# Patient Record
(demographics unavailable — no encounter records)

---

## 2017-02-16 NOTE — EDDOCDS
Nurse's Notes                                                                                     

[facility]                                                                         

Name: Ryan Cranker                                                                                

Age: 29 yrs                                                                                       

Sex: Male                                                                                         

: 1987                                                                                   

MRN: [id number]                                                                                     

Arrival Date: 2017                                                                          

Time: 15:26                                                                                       

Account#: [id number]                                                                              

Bed PD                                                                                      

Private MD:                                                                                       

Diagnosis: Dental caries;Jaw pain                                                                 

                                                                                                  

Presentation:                                                                                     

                                                                                             

15:29 Presenting complaint: Patient states: Right jaw pain radiating to right ear began a     mlb1

      week ago. Adult Sepsis Screening: The patient does not have new or worsening altered        

      mentation. Patient's respiratory rate is less than 22. Systolic blood pressure is           

      greater than 100. Patient has a qSOFA score of 0- Negative Sepsis Screen.                   

      Suicide/Homicide risk assessment- the patient denies having any suicidal and/or             

      homicidal ideations and does not present with any other emotional, behavioral or mental     

      health complaints.  Status: Patient is not a  or              

      dependent. Transition of care: patient was not received from another setting of care.       

15:29 Acuity: GINO Level 4                                                                     mlb1

15:29 Method Of Arrival: Walkin/Carried/Asstd                                                 mlb1

                                                                                                  

Triage Assessment:                                                                                

15:31 General: Appears in no apparent distress, Behavior is appropriate for age, cooperative. mlb1

      Pain: Location: right ear and right jaw Pain currently is 7 out of 10 on a pain scale.      

      HIV screening NA for this visit Offered previously.                                         

                                                                                                  

Historical:                                                                                       

- Allergies: no known allergies;                                                                  

- Home Meds:                                                                                      

1. Adderall XR 30 mg Oral cp24 1 cap once daily                                                 

2. gabapentin 300 mg Oral tab four times a day                                                  

3. Methadone Oral once daily                                                                    

- PMHx: ADHD;                                                                                     

- PSHx: none;                                                                                     

- Social history: Smoking status: Patient states former smoker of tobacco. No barriers            

to communication noted, The patient speaks fluent English, Speaks appropriately for             

age.                                                                                            

- Family history: Not pertinent.                                                                  

- : The pt / caregiver states he / she is not on anticoagulants. Home medication list             

is obtained from the patient.                                                                   

- Exposure Risk Screening:: None identified.                                                      

                                                                                                  

                                                                                                  

Screening:                                                                                        

15:49 Screening information is obtained from the patient. Fall risk: No risks identified.     jo3 

      Assistance ADL's: requires no assistance with activities of daily living. Abuse/DV          

      Screen: The patient / caregiver reports he/she is: not in a situation that causes fear,     

      pain or injury. Nutritional screening: No deficits noted. Advance Directives: There is      

      no active DNR order. home support is adequate.                                              

                                                                                                  

Assessment:                                                                                       

15:49 General: Appears in no apparent distress, comfortable, Behavior is appropriate for age, jo3 

      cooperative. General: Pt disclosed to this writer that he is on methadone at a clinic       

      and does not want to get in trouble and asked that Ultram be cancelled. Advised             

      provider . Neurological: No deficits noted. Level of Consciousness is awake, alert,         

      Oriented to person, place, time. Cardiovascular: No deficits noted. Respiratory: No         

      deficits noted. Airway is patent Respiratory effort is even, unlabored. Derm: Skin is       

      pink, warm & dry.                                                                         

                                                                                                  

Vital Signs:                                                                                      

15:28  / 64; Pulse 60; Resp 16; Temp 98.3(O); Pulse Ox 97% on R/A; Weight 74.84 kg (R); lr2 

      Height 5 ft. 11 in. (180.34 cm) (R); Pain 6/10;                                             

15:28 Body Mass Index 23.01 (74.84 kg, 180.34 cm)                                             lr2 

                                                                                                  

Vitals:                                                                                           

15:28 Log In Time: 2017 at 15:26.                                                lr2 

                                                                                                  

ED Course:                                                                                        

15:28 Patient visited by Leti Anthony.                                                         lr2 

15:28 Patient moved to Waiting                                                                lr2 

15:28 Patient moved to Pre RCE                                                                lr2 

15:29 Patient visited by Danielito Cristobal RN.                                               mlb1

15:30 Triage Initiated                                                                        mlb1

15:33 [facility]                                                        ar2 

15:33 Gregory Dupree MD is Attending Physician.                                               ar2 

15:34 Patient visited by Amor Alex PA-C.                                             ar2 

15:34 Patient moved to                                                                jb5 

15:44 [facility] Payment Agreement was scanned into [employer] and attached to record.               jp5 

15:46 Patient name changed from Mahin\S\R\S\Cranker\S\ to Mahin\S\Mikal\S\Cranker.                    
   EDMS

15:49 The patient / caregiver is instructed regarding the plan of care and ED course.         jo3 

15:49 No IV's were initiated during this patient's visit. No procedures done that require     jo3 

      assistance.                                                                                 

                                                                                                  

Order Results:                                                                                    

There are currently no results for this order.                                                    

Outcome:                                                                                          

15:40 Discharge ordered by Provider.                                                          ar2 

15:49 Discharge Assessment: Patient awake, alert and oriented x 3. No cognitive and/or        jo3 

      functional deficits noted. Patient verbalized understanding of disposition                  

      instructions. patient administered narcotics - no. The following High Risk Discharge        

      criteria are identified: None. Discharged to home ambulatory. Condition: stable.            

      Discharge instructions given to patient, Instructed on discharge instructions, follow       

      up and referral plans. medication usage, Demonstrated understanding of instructions,        

      medications, Pt was receptive of discharge instructions/ teaching. Prescriptions given      

      X 2. No special radiology studies were completed. Property sent home with patient.          

15:51 Patient left the ED.                                                                    jo3 

                                                                                                  

Signatures:                                                                                       

Dispatcher [employer]                           EDMS                                                 

Danielito Cristobal RN                   RN   mlb1                                                 

Sachi Samuels, PCA                       PCA  jb5                                                  

Augusta AdameRN                    RN   jo3                                                  

Amor Alex PA-C PA-C ar2 Price, Jennalee jp5 Ross, Laura                                  lr2                                                  

                                                                                                  

Corrections: (The following items were deleted from the chart)                                    

15:32 15:28  / ???; Pulse 64bpm; Resp 16bpm; Pulse Ox 97% RA; Temp 98.3F Oral; 74.84 kg lr2 

      Reported; Height 5 ft. 11 in. Reported; BMI: 23.0; Pain 6/10; lr2                           

                                                                                                  

**************************************************************************************************

MTDD

## 2017-02-16 NOTE — EDDOCDS
Physician Documentation                                                                           

[facility]                                                                         

Name: Ryan Cranker                                                                                

Age: 29 yrs                                                                                       

Sex: Male                                                                                         

: 1987                                                                                   

MRN: [id number]                                                                                     

Arrival Date: 2017                                                                          

Time: 15:26                                                                                       

Account#: [id number]                                                                              

Bed PD                                                                                      

Private MD:                                                                                       

Disposition:                                                                                      

17 15:40 Discharged to Home/Self Care. Impression: Dental caries, Jaw pain.                 

- Condition is Stable.                                                                            

- Discharge Instructions: Dental Pain.                                                            

- Prescriptions for Augmentin 875- 125 mg Oral Tablet - take 1 tablet by ORAL route               

every 12 hours for 10 days; 20 tablet. Ibuprofen 800 mg Oral Tablet - take 1 tablet             

by ORAL route every 8 hours As needed take with food; 30 tablet.                                

- Medication Reconciliation, Local Pharmacy Hours, Dental Referral List form.                     

- Follow up: Private Physician; When: Call to arrange an appointment; Reason: Recheck             

today's complaints. Follow up: Emergency Department; When: As needed; Reason: Fever >           

102F, Trouble breathing, Worsening of conditions.                                               

- Problem is new.                                                                                 

- Symptoms are unchanged.                                                                         

                                                                                                  

                                                                                                  

                                                                                                  

Historical:                                                                                       

- Allergies: no known allergies;                                                                  

- Home Meds:                                                                                      

1. Adderall XR 30 mg Oral cp24 1 cap once daily                                                 

2. gabapentin 300 mg Oral tab four times a day                                                  

3. Methadone Oral once daily                                                                    

- PMHx: ADHD;                                                                                     

- PSHx: none;                                                                                     

- Social history: Smoking status: Patient states former smoker of tobacco. No barriers            

to communication noted, The patient speaks fluent English, Speaks appropriately for             

age.                                                                                            

- Family history: Not pertinent.                                                                  

- : The pt / caregiver states he / she is not on anticoagulants. Home medication list             

is obtained from the patient.                                                                   

- Exposure Risk Screening:: None identified.                                                      

                                                                                                  

                                                                                                  

Vital Signs:                                                                                      

                                                                                             

15:28  / 64; Pulse 60; Resp 16; Temp 98.3(O); Pulse Ox 97% on R/A; Weight 74.84 kg /    lr2 

      164.99 lbs (R); Height 5 ft. 11 in. (180.34 cm) (R); Pain 6/10;                             

15:28 Body Mass Index 23.01 (74.84 kg, 180.34 cm)                                             lr2 

                                                                                                  

MDM:                                                                                              

15:44 [facility] Payment Agreement was scanned into [employer] and attached to record.               jp5 

15:44 Financial registration complete.                                                        jp5 

                                                                                                  

Signatures:                                                                                       

Trumann, Danielito B, RN                   RN   mlb1                                                 

Augusta Adame,RN                    RN   jo3                                                  

Amor Alex, PAGracielaC                 PAMercedez Porter                              jp5                                                  

                                                                                                  

The chart was reviewed and I authenticate all verbal orders and agree with the evaluation and 
treatment provided.Attachments:

15:44 [facility] Payment Agreement                                                                jp5 

                                                                                                  

**************************************************************************************************

MTDD

## 2017-02-18 NOTE — EDDOCDS
Physician Documentation                                                                           

[facility]                                                                         

Name: Ryan Cranker                                                                                

Age: 29 yrs                                                                                       

Sex: Male                                                                                         

: 1987                                                                                   

MRN: [id number]                                                                                     

Arrival Date: 2017                                                                          

Time: 15:26                                                                                       

Account#: [id number]                                                                              

Bed PD                                                                                      

Private MD:                                                                                       

Disposition:                                                                                      

17 15:40 Discharged to Home/Self Care. Impression: Dental caries, Jaw pain.                 

- Condition is Stable.                                                                            

- Discharge Instructions: Dental Pain.                                                            

- Prescriptions for Augmentin 875- 125 mg Oral Tablet - take 1 tablet by ORAL route               

every 12 hours for 10 days; 20 tablet. Ibuprofen 800 mg Oral Tablet - take 1 tablet             

by ORAL route every 8 hours As needed take with food; 30 tablet.                                

- Medication Reconciliation, Local Pharmacy Hours, Dental Referral List form.                     

- Follow up: Private Physician; When: Call to arrange an appointment; Reason: Recheck             

today's complaints. Follow up: Emergency Department; When: As needed; Reason: Fever >           

102F, Trouble breathing, Worsening of conditions.                                               

- Problem is new.                                                                                 

- Symptoms are unchanged.                                                                         

                                                                                                  

                                                                                                  

                                                                                                  

Historical:                                                                                       

- Allergies: no known allergies;                                                                  

- Home Meds:                                                                                      

1. Adderall XR 30 mg Oral cp24 1 cap once daily                                                 

2. gabapentin 300 mg Oral tab four times a day                                                  

3. Methadone Oral once daily                                                                    

- PMHx: ADHD;                                                                                     

- PSHx: none;                                                                                     

- Social history: Smoking status: Patient states former smoker of tobacco. No barriers            

to communication noted, The patient speaks fluent English, Speaks appropriately for             

age.                                                                                            

- Family history: Not pertinent.                                                                  

- : The pt / caregiver states he / she is not on anticoagulants. Home medication list             

is obtained from the patient.                                                                   

- Exposure Risk Screening:: None identified.                                                      

                                                                                                  

                                                                                                  

Vital Signs:                                                                                      

                                                                                             

15:28  / 64; Pulse 60; Resp 16; Temp 98.3(O); Pulse Ox 97% on R/A; Weight 74.84 kg /    lr2 

      164.99 lbs (R); Height 5 ft. 11 in. (180.34 cm) (R); Pain 6/10;                             

15:28 Body Mass Index 23.01 (74.84 kg, 180.34 cm)                                             lr2 

                                                                                                  

MDM:                                                                                              

15:44 [facility] Payment Agreement was scanned into [employer] and attached to record.               jp5 

15:44 Financial registration complete.                                                        jp5 

                                                                                             

10:24 T-Sheet-- Draft Copy was scanned into [employer] and attached to record.                   gb  

                                                                                                  

Signatures:                                                                                       

Sheryl Otero, Reg                  Reg  gb                                                   

Danielito Cristobal RN                   RN   mlb1                                                 

Augusta Adame RN                    RN   jo3                                                  

Amor Alex, Mercedez Heard PA-C                              jp5                                                  

                                                                                                  

The chart was reviewed and I authenticate all verbal orders and agree with the evaluation and 
treatment provided.Attachments:

                                                                                             

15:44 [facility] Payment Agreement                                                                jp5 

                                                                                             

10:24 T-Sheet-- Draft Copy                                                                    gb  

                                                                                                  

**************************************************************************************************



*** Chart Complete ***
MTDD

## 2017-02-18 NOTE — EDDOCDS
Nurse's Notes                                                                                     

[facility]                                                                         

Name: Ryan Cranker                                                                                

Age: 29 yrs                                                                                       

Sex: Male                                                                                         

: 1987                                                                                   

MRN: [id number]                                                                                     

Arrival Date: 2017                                                                          

Time: 15:26                                                                                       

Account#: [id number]                                                                              

Bed PD                                                                                      

Private MD:                                                                                       

Diagnosis: Dental caries;Jaw pain                                                                 

                                                                                                  

Presentation:                                                                                     

                                                                                             

15:29 Presenting complaint: Patient states: Right jaw pain radiating to right ear began a     mlb1

      week ago. Adult Sepsis Screening: The patient does not have new or worsening altered        

      mentation. Patient's respiratory rate is less than 22. Systolic blood pressure is           

      greater than 100. Patient has a qSOFA score of 0- Negative Sepsis Screen.                   

      Suicide/Homicide risk assessment- the patient denies having any suicidal and/or             

      homicidal ideations and does not present with any other emotional, behavioral or mental     

      health complaints.  Status: Patient is not a  or              

      dependent. Transition of care: patient was not received from another setting of care.       

15:29 Acuity: GINO Level 4                                                                     mlb1

15:29 Method Of Arrival: Walkin/Carried/Asstd                                                 mlb1

                                                                                                  

Triage Assessment:                                                                                

15:31 General: Appears in no apparent distress, Behavior is appropriate for age, cooperative. mlb1

      Pain: Location: right ear and right jaw Pain currently is 7 out of 10 on a pain scale.      

      HIV screening NA for this visit Offered previously.                                         

                                                                                                  

Historical:                                                                                       

- Allergies: no known allergies;                                                                  

- Home Meds:                                                                                      

1. Adderall XR 30 mg Oral cp24 1 cap once daily                                                 

2. gabapentin 300 mg Oral tab four times a day                                                  

3. Methadone Oral once daily                                                                    

- PMHx: ADHD;                                                                                     

- PSHx: none;                                                                                     

- Social history: Smoking status: Patient states former smoker of tobacco. No barriers            

to communication noted, The patient speaks fluent English, Speaks appropriately for             

age.                                                                                            

- Family history: Not pertinent.                                                                  

- : The pt / caregiver states he / she is not on anticoagulants. Home medication list             

is obtained from the patient.                                                                   

- Exposure Risk Screening:: None identified.                                                      

                                                                                                  

                                                                                                  

Screening:                                                                                        

15:49 Screening information is obtained from the patient. Fall risk: No risks identified.     jo3 

      Assistance ADL's: requires no assistance with activities of daily living. Abuse/DV          

      Screen: The patient / caregiver reports he/she is: not in a situation that causes fear,     

      pain or injury. Nutritional screening: No deficits noted. Advance Directives: There is      

      no active DNR order. home support is adequate.                                              

                                                                                                  

Assessment:                                                                                       

15:49 General: Appears in no apparent distress, comfortable, Behavior is appropriate for age, jo3 

      cooperative. General: Pt disclosed to this writer that he is on methadone at a clinic       

      and does not want to get in trouble and asked that Ultram be cancelled. Advised             

      provider . Neurological: No deficits noted. Level of Consciousness is awake, alert,         

      Oriented to person, place, time. Cardiovascular: No deficits noted. Respiratory: No         

      deficits noted. Airway is patent Respiratory effort is even, unlabored. Derm: Skin is       

      pink, warm & dry.                                                                         

                                                                                                  

Vital Signs:                                                                                      

15:28  / 64; Pulse 60; Resp 16; Temp 98.3(O); Pulse Ox 97% on R/A; Weight 74.84 kg (R); lr2 

      Height 5 ft. 11 in. (180.34 cm) (R); Pain 6/10;                                             

15:28 Body Mass Index 23.01 (74.84 kg, 180.34 cm)                                             lr2 

                                                                                                  

Vitals:                                                                                           

15:28 Log In Time: 2017 at 15:26.                                                lr2 

                                                                                                  

ED Course:                                                                                        

15:28 Patient visited by Leti Anthony.                                                         lr2 

15:28 Patient moved to Waiting                                                                lr2 

15:28 Patient moved to Pre RCE                                                                lr2 

15:29 Patient visited by Danielito Cristobal RN.                                               mlb1

15:30 Triage Initiated                                                                        mlb1

15:33 [facility]                                                        ar2 

15:33 Gregory Dupree MD is Attending Physician.                                               ar2 

15:34 Patient visited by Amor Alex PA-C.                                             ar2 

15:34 Patient moved to PD                                                               jb5 

15:44 [facility] Payment Agreement was scanned into [employer] and attached to record.               jp5 

15:46 Patient name changed from Mahin\S\R\S\Cranker\S\ to Mahin\S\Mikal\S\Cranker.                    
   EDMS

15:49 The patient / caregiver is instructed regarding the plan of care and ED course.         jo3 

15:49 No IV's were initiated during this patient's visit. No procedures done that require     jo3 

      assistance.                                                                                 

                                                                                             

10:24 T-Sheet-- Draft Copy was scanned into [employer] and attached to record.                   gb  

                                                                                                  

Order Results:                                                                                    

There are currently no results for this order.                                                    

Outcome:                                                                                          

                                                                                             

15:40 Discharge ordered by Provider.                                                          ar2 

15:49 Discharge Assessment: Patient awake, alert and oriented x 3. No cognitive and/or        jo3 

      functional deficits noted. Patient verbalized understanding of disposition                  

      instructions. patient administered narcotics - no. The following High Risk Discharge        

      criteria are identified: None. Discharged to home ambulatory. Condition: stable.            

      Discharge instructions given to patient, Instructed on discharge instructions, follow       

      up and referral plans. medication usage, Demonstrated understanding of instructions,        

      medications, Pt was receptive of discharge instructions/ teaching. Prescriptions given      

      X 2. No special radiology studies were completed. Property sent home with patient.          

15:51 Patient left the ED.                                                                    jo3 

                                                                                                  

Signatures:                                                                                       

Dispatcher [employer]                           EDMS                                                 

Sheryl Otero, Reg                  Reg  lupillo Cristobal, Danielito KIM RN                   RN   mlb1                                                 

Sachi Samuels, PCA                       PCA  jb5                                                  

Augusta Adame RN                    RN   jo3                                                  

Amor Alex, TANIA                 PAJOE ar2                                                  

Mercedez Maldonado Laura                                  lr2                                                  

                                                                                                  

Corrections: (The following items were deleted from the chart)                                    

15:32 15:28  / ???; Pulse 64bpm; Resp 16bpm; Pulse Ox 97% RA; Temp 98.3F Oral; 74.84 kg lr2 

      Reported; Height 5 ft. 11 in. Reported; BMI: 23.0; Pain 6/10; lr2                           

                                                                                                  

**************************************************************************************************



*** Chart Complete ***
MTDD

## 2017-02-18 NOTE — EDDOCDS
Physician Documentation                                                                           

[facility]                                                                         

Name: Ryan Cranker                                                                                

Age: 29 yrs                                                                                       

Sex: Male                                                                                         

: 1987                                                                                   

MRN: [id number]                                                                                     

Arrival Date: 2017                                                                          

Time: 15:26                                                                                       

Account#: [id number]                                                                              

Bed PD                                                                                      

Private MD:                                                                                       

Disposition:                                                                                      

17 15:40 Discharged to Home/Self Care. Impression: Dental caries, Jaw pain.                 

- Condition is Stable.                                                                            

- Discharge Instructions: Dental Pain.                                                            

- Prescriptions for Augmentin 875- 125 mg Oral Tablet - take 1 tablet by ORAL route               

every 12 hours for 10 days; 20 tablet. Ibuprofen 800 mg Oral Tablet - take 1 tablet             

by ORAL route every 8 hours As needed take with food; 30 tablet.                                

- Medication Reconciliation, Local Pharmacy Hours, Dental Referral List form.                     

- Follow up: Private Physician; When: Call to arrange an appointment; Reason: Recheck             

today's complaints. Follow up: Emergency Department; When: As needed; Reason: Fever >           

102F, Trouble breathing, Worsening of conditions.                                               

- Problem is new.                                                                                 

- Symptoms are unchanged.                                                                         

                                                                                                  

                                                                                                  

                                                                                                  

Historical:                                                                                       

- Allergies: no known allergies;                                                                  

- Home Meds:                                                                                      

1. Adderall XR 30 mg Oral cp24 1 cap once daily                                                 

2. gabapentin 300 mg Oral tab four times a day                                                  

3. Methadone Oral once daily                                                                    

- PMHx: ADHD;                                                                                     

- PSHx: none;                                                                                     

- Social history: Smoking status: Patient states former smoker of tobacco. No barriers            

to communication noted, The patient speaks fluent English, Speaks appropriately for             

age.                                                                                            

- Family history: Not pertinent.                                                                  

- : The pt / caregiver states he / she is not on anticoagulants. Home medication list             

is obtained from the patient.                                                                   

- Exposure Risk Screening:: None identified.                                                      

                                                                                                  

                                                                                                  

Vital Signs:                                                                                      

                                                                                             

15:28  / 64; Pulse 60; Resp 16; Temp 98.3(O); Pulse Ox 97% on R/A; Weight 74.84 kg /    lr2 

      164.99 lbs (R); Height 5 ft. 11 in. (180.34 cm) (R); Pain 6/10;                             

15:28 Body Mass Index 23.01 (74.84 kg, 180.34 cm)                                             lr2 

                                                                                                  

MDM:                                                                                              

15:44 [facility] Payment Agreement was scanned into [employer] and attached to record.               jp5 

15:44 Financial registration complete.                                                        jp5 

                                                                                             

10:24 T-Sheet-- Draft Copy was scanned into [employer] and attached to record.                   gb  

                                                                                                  

Signatures:                                                                                       

Sheryl Otero, Reg                  Reg  gb                                                   

Danielito Cristobal RN                   RN   [id number]                                                 

Augusta Adame RN                    RN   [id number]                                                  

Amor Alex, Mercedez Heard PA-C                              jp5                                                  

                                                                                                  

The chart was reviewed and I authenticate all verbal orders and agree with the evaluation and 
treatment provided.Attachments:

                                                                                             

15:44 [facility] Payment Agreement                                                                jp5 

                                                                                             

10:24 T-Sheet-- Draft Copy                                                                    gb  

                                                                                                  

**************************************************************************************************



*** Chart Complete ***
MTDD